# Patient Record
Sex: FEMALE
[De-identification: names, ages, dates, MRNs, and addresses within clinical notes are randomized per-mention and may not be internally consistent; named-entity substitution may affect disease eponyms.]

---

## 2019-08-21 PROBLEM — Z00.00 ENCOUNTER FOR PREVENTIVE HEALTH EXAMINATION: Status: ACTIVE | Noted: 2019-08-21

## 2019-09-16 ENCOUNTER — APPOINTMENT (OUTPATIENT)
Dept: SURGICAL ONCOLOGY | Facility: CLINIC | Age: 59
End: 2019-09-16
Payer: MEDICAID

## 2019-09-16 VITALS
HEART RATE: 77 BPM | WEIGHT: 121.13 LBS | BODY MASS INDEX: 22.29 KG/M2 | DIASTOLIC BLOOD PRESSURE: 86 MMHG | SYSTOLIC BLOOD PRESSURE: 152 MMHG | TEMPERATURE: 98.6 F | HEIGHT: 62 IN

## 2019-09-16 DIAGNOSIS — Z78.9 OTHER SPECIFIED HEALTH STATUS: ICD-10-CM

## 2019-09-16 DIAGNOSIS — Z85.028 PERSONAL HISTORY OF OTHER MALIGNANT NEOPLASM OF STOMACH: ICD-10-CM

## 2019-09-16 DIAGNOSIS — Z86.79 PERSONAL HISTORY OF OTHER DISEASES OF THE CIRCULATORY SYSTEM: ICD-10-CM

## 2019-09-16 PROCEDURE — 99203 OFFICE O/P NEW LOW 30 MIN: CPT

## 2019-09-16 NOTE — PHYSICAL EXAM
[Normal] : oriented to person, place and time, with appropriate affect [de-identified] : abdomen soft, incision well healed, no hernias

## 2019-09-16 NOTE — RESULTS/DATA
[FreeTextEntry1] : Labs: none to review\par \par \par \par Imaging:CT a/p w contrast 9/09/2019 ([facility])\par \par ill defined periportal soft tissue less prominent, no other disease\par \par \par \par Diagnostic procedures: none new\par

## 2019-09-16 NOTE — HISTORY OF PRESENT ILLNESS
[de-identified] : Patient Name: THIERRY DIOP 1960 \par [employer] MRN: [id number]  \par [place] MRN: \par Referring Provider:QUYEN PALACIOS MD \par Oncologist: Rosa Meyer ([facility])\par \par Date of Visit: 2019 \par \par Diagnosis: Gastric ca\par Operative date: 2019\par Procedure: Open distal gastrectomy\par Pathology: [id number]\par \par 5 months postop. Receiving chemo at [facility] w Dr Meyer every other week. Scheduled for a total of 12 cycles postop (finishing in November). Overall doing well tolerating diet. Weight up to 120. Does note some recent increase in elena incisional discomfort. Takes occasional Tylenol. No nausea or vomiting.\par \par

## 2019-09-16 NOTE — ASSESSMENT
[FreeTextEntry1] : I) s/p distal gastrectomy for node positive gastric cancer\par \par \par P) will continue with adjuvant chemotherapy. RTC after completing therapy with new imaging. Advised NSAID for the incisional discomfort. Will contact us sooner if does not get better.\par \par Rikki Thomas MD\par \par Chief Surgical Oncology\par Multidisciplinary GI cancer program\par [facility]\par [facility]\par \par Professor Surgery\par [employer]\par

## 2022-11-29 NOTE — REASON FOR VISIT
[Initial Consultation] : an initial consultation for [Gastric Cancer] : gastric cancer [Family Member] : family member 8